# Patient Record
Sex: MALE | Race: ASIAN | NOT HISPANIC OR LATINO | Employment: OTHER | URBAN - METROPOLITAN AREA
[De-identification: names, ages, dates, MRNs, and addresses within clinical notes are randomized per-mention and may not be internally consistent; named-entity substitution may affect disease eponyms.]

---

## 2018-03-27 ENCOUNTER — HOSPITAL ENCOUNTER (EMERGENCY)
Facility: HOSPITAL | Age: 48
Discharge: HOME | End: 2018-03-27
Attending: SPECIALIST
Payer: MEDICAID

## 2018-03-27 VITALS
SYSTOLIC BLOOD PRESSURE: 164 MMHG | RESPIRATION RATE: 16 BRPM | OXYGEN SATURATION: 98 % | DIASTOLIC BLOOD PRESSURE: 83 MMHG | TEMPERATURE: 97 F | HEART RATE: 74 BPM

## 2018-03-27 DIAGNOSIS — M79.674 PAIN OF TOE OF RIGHT FOOT: ICD-10-CM

## 2018-03-27 DIAGNOSIS — S92.411A CLOSED DISPLACED FRACTURE OF PROXIMAL PHALANX OF RIGHT GREAT TOE, INITIAL ENCOUNTER: Primary | ICD-10-CM
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
## 2018-03-27 PROCEDURE — 99284 EMERGENCY DEPT VISIT MOD MDM: CPT | Mod: 25

## 2018-03-27 PROCEDURE — 25000003 PHARM REV CODE 250: Performed by: REGISTERED NURSE

## 2018-03-27 PROCEDURE — 28495 TREAT BIG TOE FRACTURE: CPT | Mod: T5

## 2018-03-27 PROCEDURE — S0020 INJECTION, BUPIVICAINE HYDRO: HCPCS | Performed by: REGISTERED NURSE

## 2018-03-27 RX ORDER — LIDOCAINE HYDROCHLORIDE 10 MG/ML
10 INJECTION, SOLUTION EPIDURAL; INFILTRATION; INTRACAUDAL; PERINEURAL
Status: COMPLETED | OUTPATIENT
Start: 2018-03-27 | End: 2018-03-27

## 2018-03-27 RX ORDER — HYDROCODONE BITARTRATE AND ACETAMINOPHEN 5; 325 MG/1; MG/1
1 TABLET ORAL EVERY 4 HOURS PRN
Qty: 12 TABLET | Refills: 0 | Status: SHIPPED | OUTPATIENT
Start: 2018-03-27

## 2018-03-27 RX ORDER — BUPIVACAINE HYDROCHLORIDE 5 MG/ML
10 INJECTION, SOLUTION EPIDURAL; INTRACAUDAL
Status: COMPLETED | OUTPATIENT
Start: 2018-03-27 | End: 2018-03-27

## 2018-03-27 RX ORDER — LIDOCAINE HYDROCHLORIDE 10 MG/ML
5 INJECTION, SOLUTION EPIDURAL; INFILTRATION; INTRACAUDAL; PERINEURAL
Status: DISCONTINUED | OUTPATIENT
Start: 2018-03-27 | End: 2018-03-27 | Stop reason: HOSPADM

## 2018-03-27 RX ADMIN — BUPIVACAINE HYDROCHLORIDE 50 MG: 5 INJECTION, SOLUTION EPIDURAL; INTRACAUDAL; PERINEURAL at 12:03

## 2018-03-27 RX ADMIN — LIDOCAINE HYDROCHLORIDE 100 MG: 10 INJECTION, SOLUTION EPIDURAL; INFILTRATION; INTRACAUDAL at 12:03

## 2018-03-27 NOTE — ED PROVIDER NOTES
History      Chief Complaint   Patient presents with    Toe Injury     pt reports fall today and injured right great toe       Review of patient's allergies indicates:  No Known Allergies     HPI   HPI    3/27/2018, 11:33 AM   History obtained from the patient      History of Present Illness: Racquel De La Paz is a 47 y.o. male patient who presents to the Emergency Department for R great toe pain after falling approximately 1 hour ago. Symptoms are constant and moderate in severity. No mitigating or exacerbating factors reported. Associated sxs include pain with walking. Patient denies any LOC, weakness, dizziness, CP, SOB, any other injuries, and all other sxs at this time. Prior Tx includes nothing. No further complaints or concerns at this time.         Arrival mode: Personal vehicle      PCP: Salima Maloney MD       Past Medical History:  Past Medical History:   Diagnosis Date    Hypertension     Kidney stones        Past Surgical History:  No past surgical history on file.      Family History:  No family history on file.    Social History:  Social History     Social History Main Topics    Smoking status: Current Every Day Smoker     Packs/day: 1.00     Types: Cigarettes    Smokeless tobacco: Not on file    Alcohol use No    Drug use: No    Sexual activity: Not on file       ROS   Review of Systems   Constitutional: Negative for fever.   HENT: Negative for sore throat.    Respiratory: Negative for shortness of breath.    Cardiovascular: Negative for chest pain.   Gastrointestinal: Negative for nausea.   Genitourinary: Negative for dysuria.   Musculoskeletal: Negative for back pain.        + R great toe pain   Skin: Negative for rash.   Neurological: Negative for dizziness and weakness.   Hematological: Does not bruise/bleed easily.   All other systems reviewed and are negative.      Physical Exam      Initial Vitals [03/27/18 1116]   BP Pulse Resp Temp SpO2   (!) 164/83 74 16 96.8 °F (36 °C) 98 %      MAP        110          Physical Exam  Nursing Notes and Vital Signs Reviewed.  Constitutional: Patient is in no acute distress. Well-developed and well-nourished.  Head: Atraumatic. Normocephalic.  Eyes: PERRL. EOM intact. Conjunctivae are not pale. No scleral icterus.  ENT: Mucous membranes are moist. Oropharynx is clear and symmetric.    Neck: Supple. Full ROM. No lymphadenopathy.  Cardiovascular: Regular rate. Regular rhythm. No murmurs, rubs, or gallops. Distal pulses are 2+ and symmetric.  Pulmonary/Chest: No respiratory distress. Clear to auscultation bilaterally. No wheezing or rales.  Abdominal: Soft and non-distended.  There is no tenderness.  No rebound, guarding, or rigidity. Good bowel sounds.  Genitourinary: No CVA tenderness  Musculoskeletal: Moves all extremities. No obvious deformities. No edema. No calf tenderness. Swelling noted at base of R great toe, decreased ROM, ttp  Skin: Warm and dry.  Neurological:  Alert, awake, and appropriate.  Normal speech.  No acute focal neurological deficits are appreciated.  Psychiatric: Normal affect. Good eye contact. Appropriate in content.    ED Course    Orthopedic Injury  Date/Time: 3/27/2018 1:06 PM  Performed by: CHRISTIAN DUMONT JR  Authorized by: HEATHER CALDERON     Consent Done?:  Yes  Universal Protocol:     Verbal consent obtained?: Yes    Injury:     Injury location:  Toe    Location details:  Right great toe    Injury type:  Dislocation    Dislocation type: PIP        Pre-procedure assessment:     Neurovascular status: Neurovascularly intact      Distal perfusion: normal      Neurological function: normal      Range of motion: reduced      Local anesthesia used?: Yes      Anesthesia:  Digital block    Local anesthetic:  Lidocaine 1% without epinephrine and bupivacaine 0.5% without epinephrine    Anesthetic total (ml):  6      Selections made in this section will also lock the Injury type section above.:     Manipulation performed?: Yes      Reduction  successful?: Yes      Immobilization:  Tape    Splint type: Walking boot   Post-procedure assessment:     Neurovascular status: Neurovascularly intact      Distal perfusion: normal      Neurological function: normal      Range of motion: normal      Patient tolerance:  Patient tolerated the procedure well with no immediate complications    Splint Application  Date/Time: 3/27/2018 1:10 PM  Performed by: CHRISTIAN DUMONT JR  Authorized by: HEATHER CALDERON   Location details: right leg  Splint type: Walking boot   Post-procedure: The splinted body part was neurovascularly unchanged following the procedure.  Patient tolerance: Patient tolerated the procedure well with no immediate complications        ED Vital Signs:  Vitals:    03/27/18 1116   BP: (!) 164/83   Pulse: 74   Resp: 16   Temp: 96.8 °F (36 °C)   TempSrc: Tympanic   SpO2: 98%       Abnormal Lab Results:  Labs Reviewed - No data to display     All Lab Results:      Imaging Results:  Imaging Results          X-Ray Toe 2 or More Views Right (Final result)  Result time 03/27/18 11:55:55    Final result by Sheyla Bateman III, MD (03/27/18 11:55:55)                 Impression:     Acute displaced fracture of the 1st proximal phalanx.      Electronically signed by: SHEYLA BATEMAN MD  Date:     03/27/18  Time:    11:55              Narrative:    XR TOE 2 OR MORE VIEWS RIGHT    Clinical history: Right great toe injury     Findings: There is an acute oblique fracture of the 1st proximal phalanx distal shaft with dorsal medial displacement of the distal fracture fragment. No other fracture is identified.  Joint alignment is anatomic.  The joint spaces are well maintained.                                      The Emergency Provider reviewed the vital signs and test results, which are outlined above.    ED Discussion     1:10 PM: Reassessed pt at this time.  Pt states his condition has improved at this time. Discussed with pt all pertinent ED information and  results. Discussed pt dx and plan of tx. Gave pt all f/u and return to the ED instructions. All questions and concerns were addressed at this time. Pt expresses understanding of information and instructions, and is comfortable with plan to discharge. Pt is stable for discharge.        ED Medication(s):  Medications   lidocaine (PF) 10 mg/ml (1%) injection 50 mg (not administered)   lidocaine (PF) 10 mg/ml (1%) injection 100 mg (100 mg Infiltration Given 3/27/18 1237)   bupivacaine (PF) 0.5% (5 mg/ml) injection 50 mg (50 mg Subcutaneous Given 3/27/18 1238)       New Prescriptions    HYDROCODONE-ACETAMINOPHEN 5-325MG (NORCO) 5-325 MG PER TABLET    Take 1 tablet by mouth every 4 (four) hours as needed for Pain.       Follow-up Information     Salima Maloney MD In 3 days.    Specialty:  Cardiology  Contact information:  [address] [facility] [address]  [phone]             [facility] In 3 days.    Contact information:  [phone]                   Medical Decision Making                   Clinical Impression       ICD-10-CM ICD-9-CM   1. Closed displaced fracture of proximal phalanx of right great toe, initial encounter S92.411A 826.0   2. Pain of toe of right foot M79.674 729.5   3. Fall, initial encounter W19.XXXA E888.9               Maldonado Vogel Jr., [facility]  03/27/18 1734       Maldonado Vogel Jr., [facility]  03/27/18 1738

## 2018-03-28 ENCOUNTER — HOSPITAL ENCOUNTER (OUTPATIENT)
Dept: RADIOLOGY | Facility: HOSPITAL | Age: 48
Discharge: HOME | End: 2018-03-28
Attending: NURSE PRACTITIONER
Payer: MEDICAID

## 2018-03-28 ENCOUNTER — CLINICAL SUPPORT (OUTPATIENT)
Dept: CARDIOLOGY | Facility: CLINIC | Age: 48
End: 2018-03-28
Payer: MEDICAID

## 2018-03-28 ENCOUNTER — HOSPITAL ENCOUNTER (OUTPATIENT)
Dept: RADIOLOGY | Facility: HOSPITAL | Age: 48
Discharge: HOME | End: 2018-03-28
Attending: PODIATRIST
Payer: MEDICAID

## 2018-03-28 ENCOUNTER — OFFICE VISIT (OUTPATIENT)
Dept: INTERNAL MEDICINE | Facility: CLINIC | Age: 48
End: 2018-03-28
Payer: MEDICAID

## 2018-03-28 ENCOUNTER — OFFICE VISIT (OUTPATIENT)
Dept: PODIATRY | Facility: CLINIC | Age: 48
End: 2018-03-28
Payer: MEDICAID

## 2018-03-28 VITALS
HEIGHT: 62 IN | SYSTOLIC BLOOD PRESSURE: 151 MMHG | HEART RATE: 75 BPM | DIASTOLIC BLOOD PRESSURE: 91 MMHG | WEIGHT: 110 LBS | BODY MASS INDEX: 20.24 KG/M2

## 2018-03-28 VITALS
DIASTOLIC BLOOD PRESSURE: 94 MMHG | OXYGEN SATURATION: 99 % | BODY MASS INDEX: 19.63 KG/M2 | SYSTOLIC BLOOD PRESSURE: 140 MMHG | TEMPERATURE: 96 F | HEIGHT: 62 IN | WEIGHT: 106.69 LBS | HEART RATE: 63 BPM

## 2018-03-28 DIAGNOSIS — Z01.818 PREOP EXAMINATION: ICD-10-CM

## 2018-03-28 DIAGNOSIS — Z87.442 HISTORY OF NEPHROLITHIASIS: ICD-10-CM

## 2018-03-28 DIAGNOSIS — Z01.818 PRE-OP TESTING: ICD-10-CM

## 2018-03-28 DIAGNOSIS — Z01.818 PRE-OP EXAM: ICD-10-CM

## 2018-03-28 DIAGNOSIS — I10 BENIGN ESSENTIAL HTN: ICD-10-CM

## 2018-03-28 DIAGNOSIS — S92.421A DISPLACED FRACTURE OF DISTAL PHALANX OF RIGHT GREAT TOE, INITIAL ENCOUNTER FOR CLOSED FRACTURE: Primary | ICD-10-CM

## 2018-03-28 DIAGNOSIS — S92.421A DISPLACED FRACTURE OF DISTAL PHALANX OF RIGHT GREAT TOE, INITIAL ENCOUNTER FOR CLOSED FRACTURE: ICD-10-CM

## 2018-03-28 DIAGNOSIS — Z98.890 POST-OPERATIVE STATE: ICD-10-CM

## 2018-03-28 DIAGNOSIS — S92.411A CLOSED DISPLACED FRACTURE OF PROXIMAL PHALANX OF RIGHT GREAT TOE, INITIAL ENCOUNTER: Primary | ICD-10-CM

## 2018-03-28 DIAGNOSIS — S92.411A CLOSED DISPLACED FRACTURE OF PROXIMAL PHALANX OF RIGHT GREAT TOE, INITIAL ENCOUNTER: ICD-10-CM

## 2018-03-28 DIAGNOSIS — Z01.818 PREOP EXAMINATION: Primary | ICD-10-CM

## 2018-03-28 PROCEDURE — 99999 PR PBB SHADOW E&M-EST. PATIENT-LVL IV: CPT | Mod: PBBFAC,,, | Performed by: PODIATRIST

## 2018-03-28 PROCEDURE — 73630 X-RAY EXAM OF FOOT: CPT | Mod: TC,FY,PO,RT

## 2018-03-28 PROCEDURE — 73630 X-RAY EXAM OF FOOT: CPT | Mod: 26,RT,, | Performed by: RADIOLOGY

## 2018-03-28 PROCEDURE — 99214 OFFICE O/P EST MOD 30 MIN: CPT | Mod: PBBFAC,25,27,PO | Performed by: PODIATRIST

## 2018-03-28 PROCEDURE — 99204 OFFICE O/P NEW MOD 45 MIN: CPT | Mod: S$PBB,,, | Performed by: PODIATRIST

## 2018-03-28 PROCEDURE — 99999 PR PBB SHADOW E&M-EST. PATIENT-LVL III: CPT | Mod: PBBFAC,,, | Performed by: NURSE PRACTITIONER

## 2018-03-28 PROCEDURE — 93005 ELECTROCARDIOGRAM TRACING: CPT | Mod: PBBFAC,PO | Performed by: INTERNAL MEDICINE

## 2018-03-28 PROCEDURE — 71046 X-RAY EXAM CHEST 2 VIEWS: CPT | Mod: TC,FY,PO

## 2018-03-28 PROCEDURE — 93010 ELECTROCARDIOGRAM REPORT: CPT | Mod: S$PBB,,, | Performed by: INTERNAL MEDICINE

## 2018-03-28 PROCEDURE — 99213 OFFICE O/P EST LOW 20 MIN: CPT | Mod: PBBFAC,25,PO | Performed by: NURSE PRACTITIONER

## 2018-03-28 PROCEDURE — 99214 OFFICE O/P EST MOD 30 MIN: CPT | Mod: S$PBB,,, | Performed by: NURSE PRACTITIONER

## 2018-03-28 PROCEDURE — 71046 X-RAY EXAM CHEST 2 VIEWS: CPT | Mod: 26,,, | Performed by: RADIOLOGY

## 2018-03-28 RX ORDER — HYDROCODONE BITARTRATE AND ACETAMINOPHEN 10; 325 MG/1; MG/1
1 TABLET ORAL EVERY 6 HOURS PRN
Qty: 30 TABLET | Refills: 0 | Status: SHIPPED | OUTPATIENT
Start: 2018-03-28

## 2018-03-28 RX ORDER — CEPHALEXIN 500 MG/1
500 CAPSULE ORAL EVERY 12 HOURS
Qty: 6 CAPSULE | Refills: 0 | Status: SHIPPED | OUTPATIENT
Start: 2018-03-28 | End: 2018-03-31

## 2018-03-28 RX ORDER — LIDOCAINE HYDROCHLORIDE 10 MG/ML
1 INJECTION, SOLUTION EPIDURAL; INFILTRATION; INTRACAUDAL; PERINEURAL ONCE
Status: CANCELLED | OUTPATIENT
Start: 2018-03-28 | End: 2018-03-28

## 2018-03-28 NOTE — LETTER
March 28, 2018      Sarah Hernandez DPM  [address] [place] [address]           [facility]  [address] [place] [address]  Phone: [phone]  Fax: [phone]          Patient: Racquel De La Paz   MR Number: [id number]   YOB: 1970   Date of Visit: 3/28/2018       Dear Dr. Sarah Hernandez:    Thank you for referring Racquel De La Paz to me for evaluation. Attached you will find relevant portions of my assessment and plan of care.    If you have questions, please do not hesitate to call me. I look forward to following Racquel De La Paz along with you.    Sincerely,    Meli Waller, JAD    Enclosure  CC:  No Recipients    If you would like to receive this communication electronically, please contact [email or web address] or [phone] to request more information on [employer] access.    For providers and/or their staff who would like to refer a patient to [facility], please contact us through our one-stop-shop provider referral line, [facility] , at [phone].    If you feel you have received this communication in error or would no longer like to receive these types of communications, please e-mail [email or web address]

## 2018-03-28 NOTE — PROGRESS NOTES
[facility]  PODIATRIC MEDICINE AND SURGERY  PROGRESS NOTE    Reason for Visit   Chief Complaint   Patient presents with    Foot Injury     right hallux displaced toe fracture ambulating in tall cam boot DOI 3/27/18       HPI  Racquel De La Paz is a 47 y.o. male w/ PMH of HTN, who presents today after sustaining injury to right great toe. Pt describes mechanism as fall resulting in injuring his right great toe. They deny any LOC or proximal leg pain. Pt states unable to bear any weight on right great toe. Severity of pain noted to be 8/10.  Pt was seen in ER in which closed reduction was attempted, but unsuccessful. Pt is ambulating in boot with discomfort.   Patient denies other pedal complaints at this time.    DOI:3/27/18       PMH  Past Medical History:   Diagnosis Date    Hypertension     Kidney stones        MEDS  Current Outpatient Prescriptions on File Prior to Visit   Medication Sig Dispense Refill    hydrocodone-acetaminophen 5-325mg (NORCO) 5-325 mg per tablet Take 1 tablet by mouth every 4 (four) hours as needed for Pain. 12 tablet 0    ketorolac (TORADOL) 10 mg tablet Take 1 tablet (10 mg total) by mouth every 6 (six) hours. 14 tablet 0    ondansetron (ZOFRAN-ODT) 4 MG TbDL Take 1 tablet (4 mg total) by mouth every 6 (six) hours as needed. 15 tablet 0    tamsulosin (FLOMAX) 0.4 mg Cp24 Take 1 capsule (0.4 mg total) by mouth once daily. 30 capsule 0     No current facility-administered medications on file prior to visit.        PSH   No past surgical history on file.     ALL  Review of patient's allergies indicates:  No Known Allergies    SOC     Social History   Substance Use Topics    Smoking status: Current Every Day Smoker     Packs/day: 1.00     Types: Cigarettes    Smokeless tobacco: Never Used    Alcohol use No         Family HX  No family history on file.         REVIEW OF SYSTEMS  General: Denies any fever or chills  Chest: Denies shortness of breath, wheezing, coughing, or  "sputum production  Heart: Denies chest pain, cold extremities, orthopenia, or reduced exercise tolerance  Musk: Positive for pain as noted to affected extremity in HPI   As noted above and per history of current illness above, otherwise negative in the remainder of the 14 systems.      PHYSICAL EXAM  Vitals:    03/28/18 0943   BP: (!) 151/91   Pulse: 75   Weight: 49.9 kg (110 lb 0.2 oz)   Height: 5' 2" (1.575 m)   PainSc:   8   PainLoc: Toe       General: This patient is well-developed, well-nourished and appears stated age, well-oriented to person, place and time, and cooperative and pleasant on today's visit    Lower Extremity Physical Exam    Vascular exam:   · Dorsalis pedis and posterior tibial pulses palpable 2/4 bilaterally.   · Capillary refill time immediate to the toes.   · Feet are warm to the touch. Skin temperature warm to warm from proximally to distally   · There are no varicosities, telangiectasias noted to bilateral foot and ankle regions.   · There is  Edema and erythema localized to right hallux     Dermatologic exam:   · Skin moist with healthy texture and turgor.  · There are no open ulcerations, lacerations, or fissures to bilateral foot and ankle regions.  · There is  evidence of ecchymosis.  There are no open wounds noted.    Neuro:   · Epicritic sensation is intact as the patient is able to sense light touch to bilateral foot and ankle regions.   · Achilles and patellar deep tendon reflexes intact  · Babinski reflex absent    MSK:   + Wiggle toes   + pain dorsal aspect of right hallux with dorsal dislocation palpable of right hallux  (-) pain at 5th metatarsal base  (-) pain at fibular malleolus  (-) pain at medial malleolus  (-) pain upon palpation of tib-fib prox syndesmosis  (-) pain at ATFL, CFL, or PTFL  (-) pain at deltoid ligaments    IMAGING   Reviewed by me and I agree with radiologist findings, 3 views of foot/ankle, reveal:  No results found for this or any previous visit.    "     No results found for this or any previous visit.    No results found for this or any previous visit.     Results for orders placed during the hospital encounter of 03/28/18   X-Ray Foot Complete 3 view Right    Narrative EXAMINATION:  XR FOOT COMPLETE 3 VIEW RIGHT    CLINICAL HISTORY:  . Displaced fracture of proximal phalanx of right great toe, initial encounter for closed fracture    TECHNIQUE:  AP, lateral, and oblique views of the right foot were performed.    COMPARISON:  03/27/2018    FINDINGS:  The recently described fracture involving the 1st proximal phalanx is again noted.  Fragment positioning appears grossly unchanged allowing for projectional differences.  Soft tissue swelling noted along the dorsum of the foot.  No associated dislocation.  Remaining visualized osseous structures appear intact.  Joint spaces are preserved.  No erosive changes demonstrated.      Impression As above.      Electronically signed by: Fco Logan MD  Date:    03/28/2018  Time:    09:02         ASSESSMENT  1. Displaced fracture of distal phalanx of right great toe, initial encounter for closed fracture  Place in Outpatient    Vital signs    Strict bedrest    Insert peripheral IV    Height and weight    POCT glucose    Verify surgical site    Verify informed consent    Void    Notify physician     Pulse Oximetry Q4H    Consult to Anesthesiology    Case Request Operating Room: OPEN REDUCTION INTERNAL FIXATION-HALLUX    Insert peripheral IV   2. Pre-op testing  CBC auto differential    Basic metabolic panel    EKG 12-lead   3. Pre-op exam  Ambulatory Referral to Internal Medicine   4. Post-operative state  X-Ray Foot Complete Right       PLAN  1. Patient was educated about clinical and imaging findings, and verbalizes understanding of above.     Diagnoses and all orders for this visit:  Displaced fracture of distal phalanx of right great toe, initial encounter for closed fracture  -     Place in Outpatient; Standing  -      Vital signs; Standing  -     Strict bedrest; Standing  -     Insert peripheral IV; Standing  -     Height and weight; Standing  -     POCT glucose; Standing  -     Verify surgical site; Standing  -     Verify informed consent; Standing  -     Void; Standing  -     Notify physician ; Standing  -     Pulse Oximetry Q4H; Standing  -     Consult to Anesthesiology; Standing  -     Case Request Operating Room: OPEN REDUCTION INTERNAL FIXATION-HALLUX    Pre-op testing  -     CBC auto differential; Future; Expected date: 03/28/2018  -     Basic metabolic panel; Future; Expected date: 03/28/2018  -     EKG 12-lead; Future    Pre-op exam  -     Ambulatory Referral to Internal Medicine    Post-operative state  -     X-Ray Foot Complete Right; Future; Expected date: 03/28/2018    Other orders  -     hydrocodone-acetaminophen 10-325mg (NORCO)  mg Tab; Take 1 tablet by mouth every 6 (six) hours as needed for Pain. Do not start until AFTER SURGERY  Dispense: 30 tablet; Refill: 0  -     cephALEXin (KEFLEX) 500 MG capsule; Take 1 capsule (500 mg total) by mouth every 12 (twelve) hours. AFTER SURGERY  Dispense: 6 capsule; Refill: 0  -     lidocaine (PF) 10 mg/ml (1%) injection 10 mg; Inject 1 mL (10 mg total) into the skin once.  -     ceFAZolin (ANCEF) 2 g in dextrose 5 % 50 mL IVPB; Inject 2 g into the vein On call Procedure (Surgery).      2. Treatment plan: Discussed operative intervention due to dislocation and displacement of toe fracture. Patient agree to proceed with operative intervention scheduled for April 2, 2018. A well padded dressing applied to right foot. Pt informed to rest, ice above noted area to assist with edema control. Discussed smoking cessation and impediment of healing. Will schedule preop labs and clearance by MD for operative risk stratification.  Informed Consent has been given to the patient. The patient understands the surgery, procedure, risks, alternatives and complications, including but not  limited to reaction to medication/anesthetics, bleeding, infection, continuous pain, incomplete pain relief, worse pain, complex regional pain syndrome/RSD, failure to relieve pain, incomplete bone and soft tissue healing, numbness, nerve damage, prolonged or incomplete correction, recurrence, foot/ankle deterioration, new deformity, permanent edema, nerve or vascular damage, blood clots, pulmonary embolism, scarring, instability, limb length inequality, problems with motion and strength, failure of implants/fixation (if applicable), the possible need for more extensive surgery, the possible need for future surgery, and the distant possibility of myocardial infarction, stroke, loss of limb/life. We also discussed the expected benefits and alternatives of the procedure, including the consequences of not proceeding with the surgery, as well as the expected postop course. No guarantees were given nor implied. The procedure has been fully reviewed with the patient and written informed consent has been obtained. We have discussed the perioperative expectations. Having answered these questions and understanding the risks and benefits of the surgery, patient has opted to proceed with the surgery.   The patient understands that vendors for surgical products may be present in the operating room.    The total visit time face to face with the patient was over 30 minutes. Time spent in counseling, discussion and coordination of care with the patient was over 15 minutes. Topics discussed as noted above.      Future Appointments  Date Time Provider Department Center   4/12/2018 2:00 PM ONE[facility] Melissa POD [facility]   4/19/2018 2:00 PM [facility] Melissa [facility]   4/30/2018 2:15 PM [facility] XR2 [facility] XRAY [facility]   4/30/2018 2:40 PM Sarah Hernandez DPM [facility] POD [facility]       Report Electronically Signed By:  Sarah Cabrera DPM   Podiatric Medicine & Surgery  [facility]  3/29/2018

## 2018-03-28 NOTE — PROGRESS NOTES
Subjective:       Patient ID: Racquel De La Paz is a 47 y.o. male.    Chief Complaint: Pre-op Exam    Pre op exam for scheduled surgery-right toe closed fracture per Dr. Hernandez on 4/2/18.     PMH kidney stones- pt states that he did have a lithotripsy per Dr. Lee Villela at [facility] two years ago. He has not had any other occurrences since then    HTN- he states that he takes an oral BP medication daily prescribed by his PCP Dr. Maloney but does not recall the name of the medication. He reports that he checks his BP at home- he cannot recall how often or what his BP readings are. He avoids sodium. He drinks adequate water intake. He denies, cp, sob, headaches, blurred vision, dizziness, syncope, or leg swelling.     He reports that the fracture of his right toe occurred after walking into a bathroom at home and slipped due to water being on the floor. This fall caused the toe fracture. He has been taking hydrocodone per ER physician which helps somewhat with pain. New rxs today per podiatry include keflex and norco.     He denies fever, sweats, chills, n/v/d, abd pain or urinary discomfort.           Review of Systems   Constitutional: Positive for activity change. Negative for appetite change, chills, diaphoresis, fatigue, fever and unexpected weight change.   HENT: Negative for congestion, ear pain, postnasal drip, rhinorrhea, sinus pain, sinus pressure, sneezing, sore throat, tinnitus, trouble swallowing and voice change.    Eyes: Negative for photophobia, pain and visual disturbance.   Respiratory: Negative for cough, chest tightness, shortness of breath and wheezing.    Cardiovascular: Negative for chest pain, palpitations and leg swelling.   Gastrointestinal: Negative for abdominal distention, abdominal pain, blood in stool, constipation, diarrhea, nausea and vomiting.   Genitourinary: Negative for dysuria and frequency.   Musculoskeletal: Positive for arthralgias and myalgias. Negative for back pain, joint swelling,  neck pain and neck stiffness.   Neurological: Negative for dizziness, tremors, seizures, syncope, facial asymmetry, speech difficulty, weakness, light-headedness, numbness and headaches.   Psychiatric/Behavioral: Negative for confusion and sleep disturbance.       Objective:      Physical Exam   Constitutional: He is oriented to person, place, and time.   Eyes: Conjunctivae and EOM are normal.   Neck: Normal range of motion. Neck supple.   Cardiovascular: Normal rate, regular rhythm, normal heart sounds and intact distal pulses.    Pulmonary/Chest: Effort normal and breath sounds normal.   Abdominal: Soft. Bowel sounds are normal.   Musculoskeletal: Normal range of motion.   Dressing CDI to right foot - ortho shoe present.    Neurological: He is alert and oriented to person, place, and time.   Skin: Skin is warm and dry. Capillary refill takes less than 2 seconds.   Psychiatric: He has a normal mood and affect.       Assessment:       1. Preop examination    2. Displaced fracture of distal phalanx of right great toe, initial encounter for closed fracture    3. Benign essential HTN    4. History of nephrolithiasis        Plan:   Preop examination  -     X-Ray Chest PA And Lateral; Future; Expected date: 03/28/2018  -     Urinalysis; Future; Expected date: 03/28/2018    Displaced fracture of distal phalanx of right great toe, initial encounter for closed fracture    Benign essential HTN    History of nephrolithiasis    Other orders  -     Cancel: Comprehensive metabolic panel; Future; Expected date: 03/28/2018  -     Cancel: CBC auto differential; Future; Expected date: 03/28/2018  -     Cancel: EKG 12-lead; Future      Labs and EKG ordered per podiatry  CXR and UA now  Clearance pending results    Addendum- all pre-op testing normal. Pt is at low risk for perioperative complications. He is cleared for surgery.

## 2018-03-29 ENCOUNTER — TELEPHONE (OUTPATIENT)
Dept: INTERNAL MEDICINE | Facility: CLINIC | Age: 48
End: 2018-03-29

## 2018-03-29 ENCOUNTER — ANESTHESIA EVENT (OUTPATIENT)
Dept: SURGERY | Facility: HOSPITAL | Age: 48
End: 2018-03-29
Payer: MEDICAID

## 2018-03-29 RX ORDER — BISOPROLOL FUMARATE AND HYDROCHLOROTHIAZIDE 5; 6.25 MG/1; MG/1
1 TABLET ORAL DAILY
COMMUNITY

## 2018-03-29 NOTE — TELEPHONE ENCOUNTER
----- Message from Meli Waller NP sent at 3/29/2018  8:53 AM CDT -----  Please inform patient that he is cleared for surgery on Monday.

## 2018-03-29 NOTE — PRE-PROCEDURE INSTRUCTIONS
Pre op instructions reviewed with patient per phone:    To confirm, Your surgeon has instructed you:  Surgery is scheduled 4/2/18 at 0700.      Please report to [facility] 1st floor main lobby by 0530.   Pre admit office to call this afternoon only if arrival time for surgery changes.      INSTRUCTIONS IMPORTANT!!!  ¨ Do not eat, drink, or smoke after 12 midnight-including water. OK to brush teeth, no gum, candy or mints!    ¨ Take only these medicines with a small swallow of water-morning of surgery.  None  ____  Do not wear makeup, including mascara.  ____  No powder, lotions or creams to surgical area.  ____  Please remove all jewelry, including piercings and leave at home.  ____  No money or valuables needed. Please leave at home.  ____  Please bring identification and insurance information to hospital.  ____  If going home the same day, arrange for a ride home. You will not be able to   drive if Anesthesia was used.  ____  Children, under 12 years old, must remain in the waiting room with an adult.  They are not allowed in patient areas.  ____  Wear loose fitting clothing. Allow for dressings, bandages.  ____  Stop Aspirin, Ibuprofen, Motrin and Aleve at least 5-7 days before surgery, unless otherwise instructed by your doctor, or the nurse.   You MAY use Tylenol/acetaminophen until day of surgery.  ____  If you take diabetic medication, do not take am of surgery unless instructed by   Doctor.  ____ Stop taking any Fish Oil supplement or any Vitamins that contain Vitamin E at least 5 days prior to surgery.          Bathing Instructions-- The night before surgery and the morning prior to coming to the hospital:   -Do not shave the surgical area.   -Shower and wash your hair and body as usual with anti-bacterial  soap and shampoo.   -Rinse your hair and body completely.   -Use one packet of hibiclens to wash the surgical site (using your hand) gently for 5 minutes.  Do not scrub you skin  too hard.   -Do not use hibiclens on your head, face, or genitals.   -Do not wash with anti-bacterial soap after you use the hibiclens.   -Rinse your body thoroughly.   -Dry with clean, soft towel.  Do not use lotion, cream, deodorant, or powders on   the surgical site.    Use antibacterial soap in place of hibiclens if your surgery is on the head, face or genitals.         Surgical Site Infection    Prevention of surgical site infections:     -Keep incisions clean and dry.   -Do not soak/submerge incisions in water until completely healed.   -Do not apply lotions, powders, creams, or deodorants to site.   -Always make sure hands are cleaned with antibacterial soap/ alcohol-based   prior to touching the surgical site.  (This includes doctors, nurses, staff, and yourself.)    Signs and symptoms:   -Redness and pain around the area where you had surgery   -Drainage of cloudy fluid from your surgical wound   -Fever over 100.4  I have read or had read and explained to me, and understand the above information.

## 2018-04-02 ENCOUNTER — SURGERY (OUTPATIENT)
Age: 48
End: 2018-04-02

## 2018-04-02 ENCOUNTER — ANESTHESIA (OUTPATIENT)
Dept: SURGERY | Facility: HOSPITAL | Age: 48
End: 2018-04-02
Payer: MEDICAID

## 2018-04-02 ENCOUNTER — HOSPITAL ENCOUNTER (OUTPATIENT)
Facility: HOSPITAL | Age: 48
Discharge: HOME | End: 2018-04-02
Attending: PODIATRIST | Admitting: PODIATRIST
Payer: MEDICAID

## 2018-04-02 DIAGNOSIS — S92.421A DISPLACED FRACTURE OF DISTAL PHALANX OF RIGHT GREAT TOE, INITIAL ENCOUNTER FOR CLOSED FRACTURE: ICD-10-CM

## 2018-04-02 PROCEDURE — 01480 ANES OPEN PX LOWER L/A/F NOS: CPT | Performed by: PODIATRIST

## 2018-04-02 PROCEDURE — S0020 INJECTION, BUPIVICAINE HYDRO: HCPCS | Performed by: PODIATRIST

## 2018-04-02 PROCEDURE — 25000003 PHARM REV CODE 250: Performed by: PODIATRIST

## 2018-04-02 PROCEDURE — 37000009 HC ANESTHESIA EA ADD 15 MINS: Performed by: PODIATRIST

## 2018-04-02 PROCEDURE — 27201423 OPTIME MED/SURG SUP & DEVICES STERILE SUPPLY: Performed by: PODIATRIST

## 2018-04-02 PROCEDURE — 37000008 HC ANESTHESIA 1ST 15 MINUTES: Performed by: PODIATRIST

## 2018-04-02 PROCEDURE — C1769 GUIDE WIRE: HCPCS | Performed by: PODIATRIST

## 2018-04-02 PROCEDURE — 25000003 PHARM REV CODE 250: Performed by: NURSE ANESTHETIST, CERTIFIED REGISTERED

## 2018-04-02 PROCEDURE — 63600175 PHARM REV CODE 636 W HCPCS: Performed by: ANESTHESIOLOGY

## 2018-04-02 PROCEDURE — 63600175 PHARM REV CODE 636 W HCPCS: Performed by: NURSE ANESTHETIST, CERTIFIED REGISTERED

## 2018-04-02 PROCEDURE — 71000015 HC POSTOP RECOV 1ST HR: Performed by: PODIATRIST

## 2018-04-02 PROCEDURE — 36000708 HC OR TIME LEV III 1ST 15 MIN: Performed by: PODIATRIST

## 2018-04-02 PROCEDURE — 28505 TREAT BIG TOE FRACTURE: CPT | Mod: T5,,, | Performed by: PODIATRIST

## 2018-04-02 PROCEDURE — 71000033 HC RECOVERY, INTIAL HOUR: Performed by: PODIATRIST

## 2018-04-02 PROCEDURE — 36000709 HC OR TIME LEV III EA ADD 15 MIN: Performed by: PODIATRIST

## 2018-04-02 PROCEDURE — C1713 ANCHOR/SCREW BN/BN,TIS/BN: HCPCS | Performed by: PODIATRIST

## 2018-04-02 DEVICE — SCREW BONE CORT 2X18MM: Type: IMPLANTABLE DEVICE | Site: FOOT | Status: FUNCTIONAL

## 2018-04-02 DEVICE — SCREW BONE CORT 1.5X16MM: Type: IMPLANTABLE DEVICE | Site: FOOT | Status: FUNCTIONAL

## 2018-04-02 RX ORDER — BUPIVACAINE HYDROCHLORIDE 5 MG/ML
INJECTION, SOLUTION EPIDURAL; INTRACAUDAL
Status: DISCONTINUED | OUTPATIENT
Start: 2018-04-02 | End: 2018-04-02 | Stop reason: HOSPADM

## 2018-04-02 RX ORDER — PROPOFOL 10 MG/ML
VIAL (ML) INTRAVENOUS
Status: DISCONTINUED | OUTPATIENT
Start: 2018-04-02 | End: 2018-04-02

## 2018-04-02 RX ORDER — SODIUM CHLORIDE, SODIUM LACTATE, POTASSIUM CHLORIDE, CALCIUM CHLORIDE 600; 310; 30; 20 MG/100ML; MG/100ML; MG/100ML; MG/100ML
INJECTION, SOLUTION INTRAVENOUS CONTINUOUS PRN
Status: DISCONTINUED | OUTPATIENT
Start: 2018-04-02 | End: 2018-04-02

## 2018-04-02 RX ORDER — MIDAZOLAM HYDROCHLORIDE 1 MG/ML
INJECTION, SOLUTION INTRAMUSCULAR; INTRAVENOUS
Status: DISCONTINUED | OUTPATIENT
Start: 2018-04-02 | End: 2018-04-02

## 2018-04-02 RX ORDER — FENTANYL CITRATE 50 UG/ML
INJECTION, SOLUTION INTRAMUSCULAR; INTRAVENOUS
Status: DISCONTINUED | OUTPATIENT
Start: 2018-04-02 | End: 2018-04-02

## 2018-04-02 RX ORDER — PHENYLEPHRINE HYDROCHLORIDE 10 MG/ML
INJECTION INTRAVENOUS
Status: DISCONTINUED | OUTPATIENT
Start: 2018-04-02 | End: 2018-04-02

## 2018-04-02 RX ORDER — ACETAMINOPHEN 10 MG/ML
INJECTION, SOLUTION INTRAVENOUS
Status: DISCONTINUED | OUTPATIENT
Start: 2018-04-02 | End: 2018-04-02

## 2018-04-02 RX ORDER — ONDANSETRON 2 MG/ML
4 INJECTION INTRAMUSCULAR; INTRAVENOUS DAILY PRN
Status: DISCONTINUED | OUTPATIENT
Start: 2018-04-02 | End: 2018-04-02 | Stop reason: HOSPADM

## 2018-04-02 RX ORDER — DIPHENHYDRAMINE HYDROCHLORIDE 50 MG/ML
25 INJECTION INTRAMUSCULAR; INTRAVENOUS EVERY 6 HOURS PRN
Status: DISCONTINUED | OUTPATIENT
Start: 2018-04-02 | End: 2018-04-02 | Stop reason: HOSPADM

## 2018-04-02 RX ORDER — PROPOFOL 10 MG/ML
VIAL (ML) INTRAVENOUS CONTINUOUS PRN
Status: DISCONTINUED | OUTPATIENT
Start: 2018-04-02 | End: 2018-04-02

## 2018-04-02 RX ORDER — LIDOCAINE HYDROCHLORIDE 10 MG/ML
INJECTION INFILTRATION; PERINEURAL
Status: DISCONTINUED | OUTPATIENT
Start: 2018-04-02 | End: 2018-04-02 | Stop reason: HOSPADM

## 2018-04-02 RX ORDER — LIDOCAINE HYDROCHLORIDE 10 MG/ML
INJECTION INFILTRATION; PERINEURAL
Status: DISCONTINUED | OUTPATIENT
Start: 2018-04-02 | End: 2018-04-02

## 2018-04-02 RX ORDER — HYDROCODONE BITARTRATE AND ACETAMINOPHEN 5; 325 MG/1; MG/1
1 TABLET ORAL
Status: DISCONTINUED | OUTPATIENT
Start: 2018-04-02 | End: 2018-04-02 | Stop reason: HOSPADM

## 2018-04-02 RX ORDER — LIDOCAINE HYDROCHLORIDE 10 MG/ML
1 INJECTION, SOLUTION EPIDURAL; INFILTRATION; INTRACAUDAL; PERINEURAL ONCE
Status: DISCONTINUED | OUTPATIENT
Start: 2018-04-02 | End: 2018-04-02 | Stop reason: HOSPADM

## 2018-04-02 RX ORDER — MEPERIDINE HYDROCHLORIDE 50 MG/ML
12.5 INJECTION INTRAMUSCULAR; INTRAVENOUS; SUBCUTANEOUS ONCE AS NEEDED
Status: COMPLETED | OUTPATIENT
Start: 2018-04-02 | End: 2018-04-02

## 2018-04-02 RX ORDER — FENTANYL CITRATE 50 UG/ML
25 INJECTION, SOLUTION INTRAMUSCULAR; INTRAVENOUS EVERY 5 MIN PRN
Status: DISCONTINUED | OUTPATIENT
Start: 2018-04-02 | End: 2018-04-02 | Stop reason: HOSPADM

## 2018-04-02 RX ADMIN — MIDAZOLAM 1 MG: 1 INJECTION INTRAMUSCULAR; INTRAVENOUS at 07:04

## 2018-04-02 RX ADMIN — FENTANYL CITRATE 25 MCG: 50 INJECTION, SOLUTION INTRAMUSCULAR; INTRAVENOUS at 07:04

## 2018-04-02 RX ADMIN — LIDOCAINE HYDROCHLORIDE 10 ML: 10 INJECTION, SOLUTION INFILTRATION; PERINEURAL at 07:04

## 2018-04-02 RX ADMIN — PHENYLEPHRINE HYDROCHLORIDE 100 MCG: 10 INJECTION INTRAVENOUS at 07:04

## 2018-04-02 RX ADMIN — FENTANYL CITRATE 50 MCG: 50 INJECTION, SOLUTION INTRAMUSCULAR; INTRAVENOUS at 08:04

## 2018-04-02 RX ADMIN — PROPOFOL 200 MCG/KG/MIN: 10 INJECTION, EMULSION INTRAVENOUS at 07:04

## 2018-04-02 RX ADMIN — SODIUM CHLORIDE, SODIUM LACTATE, POTASSIUM CHLORIDE, AND CALCIUM CHLORIDE: 600; 310; 30; 20 INJECTION, SOLUTION INTRAVENOUS at 06:04

## 2018-04-02 RX ADMIN — MEPERIDINE HYDROCHLORIDE 12.5 MG: 50 INJECTION INTRAMUSCULAR; INTRAVENOUS; SUBCUTANEOUS at 09:04

## 2018-04-02 RX ADMIN — FENTANYL CITRATE 25 MCG: 50 INJECTION, SOLUTION INTRAMUSCULAR; INTRAVENOUS at 09:04

## 2018-04-02 RX ADMIN — BUPIVACAINE HYDROCHLORIDE 30 ML: 5 INJECTION, SOLUTION EPIDURAL; INTRACAUDAL; PERINEURAL at 07:04

## 2018-04-02 RX ADMIN — LIDOCAINE HYDROCHLORIDE 50 MG: 10 INJECTION, SOLUTION INFILTRATION; PERINEURAL at 07:04

## 2018-04-02 RX ADMIN — PROPOFOL 50 MG: 10 INJECTION, EMULSION INTRAVENOUS at 07:04

## 2018-04-02 RX ADMIN — SODIUM CHLORIDE, SODIUM LACTATE, POTASSIUM CHLORIDE, AND CALCIUM CHLORIDE: 600; 310; 30; 20 INJECTION, SOLUTION INTRAVENOUS at 08:04

## 2018-04-02 RX ADMIN — FENTANYL CITRATE 25 MCG: 50 INJECTION, SOLUTION INTRAMUSCULAR; INTRAVENOUS at 08:04

## 2018-04-02 RX ADMIN — CEFAZOLIN 2 G: 1 INJECTION, POWDER, FOR SOLUTION INTRAMUSCULAR; INTRAVENOUS at 07:04

## 2018-04-02 RX ADMIN — MIDAZOLAM 1 MG: 1 INJECTION INTRAMUSCULAR; INTRAVENOUS at 06:04

## 2018-04-02 RX ADMIN — ACETAMINOPHEN 1000 MG: 10 INJECTION, SOLUTION INTRAVENOUS at 08:04

## 2018-04-02 NOTE — ANESTHESIA PREPROCEDURE EVALUATION
04/02/2018  Racquel De La Paz is a 47 y.o., male.    Anesthesia Evaluation    I have reviewed the Patient Summary Reports.    I have reviewed the Nursing Notes.   I have reviewed the Medications.     Review of Systems  Anesthesia Hx:  No problems with previous Anesthesia  Denies Family Hx of Anesthesia complications.   Denies Personal Hx of Anesthesia complications.   Social:  Smoker    Cardiovascular:   Hypertension, well controlled    Pulmonary:  Pulmonary Normal    Renal/:   renal calculi    Hepatic/GI:  Hepatic/GI Normal    Neurological:  Neurology Normal    Endocrine:  Endocrine Normal        Physical Exam  General:  Well nourished    Airway/Jaw/Neck:  Airway Findings: Mallampati: I      Chest/Lungs:  Chest/Lungs Findings: Clear to auscultation     Heart/Vascular:  Heart Findings: Rate: Normal  Rhythm: Regular Rhythm             Anesthesia Plan  Type of Anesthesia, risks & benefits discussed:  Anesthesia Type:  MAC  Patient's Preference:   Intra-op Monitoring Plan:   Intra-op Monitoring Plan Comments:   Post Op Pain Control Plan:   Post Op Pain Control Plan Comments:   Induction:   IV  Beta Blocker:  Patient is not currently on a Beta-Blocker (No further documentation required).       Informed Consent: Patient understands risks and agrees with Anesthesia plan.  Questions answered. Anesthesia consent signed with patient.  ASA Score: 2     Day of Surgery Review of History & Physical: I have interviewed and examined the patient. I have reviewed the patient's H&P dated:  There are no significant changes.          Ready For Surgery From Anesthesia Perspective.

## 2018-04-02 NOTE — BRIEF OP NOTE
[facility]  BRIEF OPERATIVE NOTE  Date: 4/2/2018   Patient: Racquel De La Paz   Medical Record Number: [id number]   Surgeon: Sarah Hernandez DPM  Assistant: None  Preoperative Diagnosis: closed displaced fracture of right hallux  Postoperative Diagnosis: closed displaced fracture of right hallux  Surgical Procedure(s): Open reduction with internal fixation, right hallux  Pathology: none sent   Anesthesia:   MAC with local injection of 20 ccs 1:1 mixture of 0.5% marcaine plain and 1% lidocaine plain   Hemostasis: pneumatic ankle tourniquet @ 250mm Hg for 90 minutes  Estimated blood loss: 5mL  Materials:   1.3-0, 2-0 vicryl, 4-0 nylon  Implants: 2.0 mm x 18 mm synthes cortical screw, 1.5 mm x 16 mm synthes cortical screw   Findings: consistent with diagnosis  Fluids: per anesthesia  Complications: None  Condition: Stable

## 2018-04-02 NOTE — TRANSFER OF CARE
"Anesthesia Transfer of Care Note    Patient: Racquel De La Paz    Procedure(s) Performed: Procedure(s) (LRB):  OPEN REDUCTION INTERNAL FIXATION-HALLUX (Right)    Patient location: PACU    Anesthesia Type: MAC    Transport from OR: Transported from OR on room air with adequate spontaneous ventilation    Post pain: adequate analgesia    Post assessment: no apparent anesthetic complications and tolerated procedure well    Post vital signs: stable    Level of consciousness: sedated    Nausea/Vomiting: no nausea/vomiting    Complications: none    Transfer of care protocol was followed      Last vitals:   Visit Vitals  BP (!) 140/95   Pulse 72   Temp 36.7 °C (98 °F) (Tympanic)   Resp 18   Ht 5' 2" (1.575 m)   Wt 48.3 kg (106 lb 7.7 oz)   BMI 19.48 kg/m²     "

## 2018-04-02 NOTE — DISCHARGE SUMMARY
[facility]  Discharge Note  Short Stay    Admit Date: 4/2/2018    Discharge Date and Time: No discharge date for patient encounter.     Attending Physician: Sarah Hernandez DPM     Discharge Provider: Sarah Hernandez    Diagnoses:  Active Hospital Problems    Diagnosis  POA    Displaced fracture of distal phalanx of right great toe, initial encounter for closed fracture [S92.421A]  Yes      Resolved Hospital Problems    Diagnosis Date Resolved POA   No resolved problems to display.       Discharged Condition: good    Hospital Course: Patient was admitted for an outpatient procedure and tolerated the procedure well with no complications.    Final Diagnoses: Same as principal problem.    Disposition: Home or Self Care    Follow up/Patient Instructions:    Medications:  Reconciled Home Medications:      Medication List      CONTINUE taking these medications    bisoprolol-hydrochlorothiazide 5-6.25 mg 5-6.25 mg Tab  Commonly known as:  ZIAC     * hydrocodone-acetaminophen 5-325mg 5-325 mg per tablet  Commonly known as:  NORCO  Take 1 tablet by mouth every 4 (four) hours as needed for Pain.     * hydrocodone-acetaminophen 10-325mg  mg Tab  Commonly known as:  NORCO  Take 1 tablet by mouth every 6 (six) hours as needed for Pain. Do not start until AFTER SURGERY     ondansetron 4 MG Tbdl  Commonly known as:  ZOFRAN-ODT  Take 1 tablet (4 mg total) by mouth every 6 (six) hours as needed.     tamsulosin 0.4 mg Cp24  Commonly known as:  FLOMAX  Take 1 capsule (0.4 mg total) by mouth once daily.        * This list has 2 medication(s) that are the same as other medications prescribed for you. Read the directions carefully, and ask your doctor or other care provider to review them with you.            ASK your doctor about these medications    ketorolac 10 mg tablet  Commonly known as:  TORADOL  Take 1 tablet (10 mg total) by mouth every 6 (six) hours.            Discharge Procedure Orders  CRUTCHES FOR  "HOME USE   Order Specific Question Answer Comments   Type: Axillary    Height: 5' 2" (1.575 m)    Weight: 48.3 kg (106 lb 7.7 oz)    Length of need (1-99 months): 3      Diet general     Sponge bath only until clinic visit     Keep surgical extremity elevated     Ice to affected area     Lifting restrictions     Weight bearing restrictions (specify)     Call MD for:  temperature >100.4     Call MD for:  persistent nausea and vomiting     Call MD for:  severe uncontrolled pain     Call MD for:  difficulty breathing, headache or visual disturbances     Call MD for:  redness, tenderness, or signs of infection (pain, swelling, redness, odor or green/yellow discharge around incision site)     Call MD for:  hives     Call MD for:  persistent dizziness or light-headedness     Leave dressing on - Keep it clean, dry, and intact until clinic visit           Discharge Procedure Orders (must include Diet, Follow-up, Activity):    Discharge Procedure Orders (must include Diet, Follow-up, Activity)  CRUTCHES FOR HOME USE   Order Specific Question Answer Comments   Type: Axillary    Height: 5' 2" (1.575 m)    Weight: 48.3 kg (106 lb 7.7 oz)    Length of need (1-99 months): 3      Diet general     Sponge bath only until clinic visit     Keep surgical extremity elevated     Ice to affected area     Lifting restrictions     Weight bearing restrictions (specify)     Call MD for:  temperature >100.4     Call MD for:  persistent nausea and vomiting     Call MD for:  severe uncontrolled pain     Call MD for:  difficulty breathing, headache or visual disturbances     Call MD for:  redness, tenderness, or signs of infection (pain, swelling, redness, odor or green/yellow discharge around incision site)     Call MD for:  hives     Call MD for:  persistent dizziness or light-headedness     Leave dressing on - Keep it clean, dry, and intact until clinic visit        "

## 2018-04-02 NOTE — H&P (VIEW-ONLY)
[facility]  PODIATRIC MEDICINE AND SURGERY  PROGRESS NOTE    Reason for Visit   Chief Complaint   Patient presents with    Foot Injury     right hallux displaced toe fracture ambulating in tall cam boot DOI 3/27/18       HPI  Racquel De La Paz is a 47 y.o. male w/ PMH of HTN, who presents today after sustaining injury to right great toe. Pt describes mechanism as fall resulting in injuring his right great toe. They deny any LOC or proximal leg pain. Pt states unable to bear any weight on right great toe. Severity of pain noted to be 8/10.  Pt was seen in ER in which closed reduction was attempted, but unsuccessful. Pt is ambulating in boot with discomfort.   Patient denies other pedal complaints at this time.    DOI:3/27/18       PMH  Past Medical History:   Diagnosis Date    Hypertension     Kidney stones        MEDS  Current Outpatient Prescriptions on File Prior to Visit   Medication Sig Dispense Refill    hydrocodone-acetaminophen 5-325mg (NORCO) 5-325 mg per tablet Take 1 tablet by mouth every 4 (four) hours as needed for Pain. 12 tablet 0    ketorolac (TORADOL) 10 mg tablet Take 1 tablet (10 mg total) by mouth every 6 (six) hours. 14 tablet 0    ondansetron (ZOFRAN-ODT) 4 MG TbDL Take 1 tablet (4 mg total) by mouth every 6 (six) hours as needed. 15 tablet 0    tamsulosin (FLOMAX) 0.4 mg Cp24 Take 1 capsule (0.4 mg total) by mouth once daily. 30 capsule 0     No current facility-administered medications on file prior to visit.        PSH   No past surgical history on file.     ALL  Review of patient's allergies indicates:  No Known Allergies    SOC     Social History   Substance Use Topics    Smoking status: Current Every Day Smoker     Packs/day: 1.00     Types: Cigarettes    Smokeless tobacco: Never Used    Alcohol use No         Family HX  No family history on file.         REVIEW OF SYSTEMS  General: Denies any fever or chills  Chest: Denies shortness of breath, wheezing, coughing, or  "sputum production  Heart: Denies chest pain, cold extremities, orthopenia, or reduced exercise tolerance  Musk: Positive for pain as noted to affected extremity in HPI   As noted above and per history of current illness above, otherwise negative in the remainder of the 14 systems.      PHYSICAL EXAM  Vitals:    03/28/18 0943   BP: (!) 151/91   Pulse: 75   Weight: 49.9 kg (110 lb 0.2 oz)   Height: 5' 2" (1.575 m)   PainSc:   8   PainLoc: Toe       General: This patient is well-developed, well-nourished and appears stated age, well-oriented to person, place and time, and cooperative and pleasant on today's visit    Lower Extremity Physical Exam    Vascular exam:   · Dorsalis pedis and posterior tibial pulses palpable 2/4 bilaterally.   · Capillary refill time immediate to the toes.   · Feet are warm to the touch. Skin temperature warm to warm from proximally to distally   · There are no varicosities, telangiectasias noted to bilateral foot and ankle regions.   · There is  Edema and erythema localized to right hallux     Dermatologic exam:   · Skin moist with healthy texture and turgor.  · There are no open ulcerations, lacerations, or fissures to bilateral foot and ankle regions.  · There is  evidence of ecchymosis.  There are no open wounds noted.    Neuro:   · Epicritic sensation is intact as the patient is able to sense light touch to bilateral foot and ankle regions.   · Achilles and patellar deep tendon reflexes intact  · Babinski reflex absent    MSK:   + Wiggle toes   + pain dorsal aspect of right hallux with dorsal dislocation palpable of right hallux  (-) pain at 5th metatarsal base  (-) pain at fibular malleolus  (-) pain at medial malleolus  (-) pain upon palpation of tib-fib prox syndesmosis  (-) pain at ATFL, CFL, or PTFL  (-) pain at deltoid ligaments    IMAGING   Reviewed by me and I agree with radiologist findings, 3 views of foot/ankle, reveal:  No results found for this or any previous visit.    "     No results found for this or any previous visit.    No results found for this or any previous visit.     Results for orders placed during the hospital encounter of 03/28/18   X-Ray Foot Complete 3 view Right    Narrative EXAMINATION:  XR FOOT COMPLETE 3 VIEW RIGHT    CLINICAL HISTORY:  . Displaced fracture of proximal phalanx of right great toe, initial encounter for closed fracture    TECHNIQUE:  AP, lateral, and oblique views of the right foot were performed.    COMPARISON:  03/27/2018    FINDINGS:  The recently described fracture involving the 1st proximal phalanx is again noted.  Fragment positioning appears grossly unchanged allowing for projectional differences.  Soft tissue swelling noted along the dorsum of the foot.  No associated dislocation.  Remaining visualized osseous structures appear intact.  Joint spaces are preserved.  No erosive changes demonstrated.      Impression As above.      Electronically signed by: Fco Logan MD  Date:    03/28/2018  Time:    09:02         ASSESSMENT  1. Displaced fracture of distal phalanx of right great toe, initial encounter for closed fracture  Place in Outpatient    Vital signs    Strict bedrest    Insert peripheral IV    Height and weight    POCT glucose    Verify surgical site    Verify informed consent    Void    Notify physician     Pulse Oximetry Q4H    Consult to Anesthesiology    Case Request Operating Room: OPEN REDUCTION INTERNAL FIXATION-HALLUX    Insert peripheral IV   2. Pre-op testing  CBC auto differential    Basic metabolic panel    EKG 12-lead   3. Pre-op exam  Ambulatory Referral to Internal Medicine   4. Post-operative state  X-Ray Foot Complete Right       PLAN  1. Patient was educated about clinical and imaging findings, and verbalizes understanding of above.     Diagnoses and all orders for this visit:  Displaced fracture of distal phalanx of right great toe, initial encounter for closed fracture  -     Place in Outpatient; Standing  -      Vital signs; Standing  -     Strict bedrest; Standing  -     Insert peripheral IV; Standing  -     Height and weight; Standing  -     POCT glucose; Standing  -     Verify surgical site; Standing  -     Verify informed consent; Standing  -     Void; Standing  -     Notify physician ; Standing  -     Pulse Oximetry Q4H; Standing  -     Consult to Anesthesiology; Standing  -     Case Request Operating Room: OPEN REDUCTION INTERNAL FIXATION-HALLUX    Pre-op testing  -     CBC auto differential; Future; Expected date: 03/28/2018  -     Basic metabolic panel; Future; Expected date: 03/28/2018  -     EKG 12-lead; Future    Pre-op exam  -     Ambulatory Referral to Internal Medicine    Post-operative state  -     X-Ray Foot Complete Right; Future; Expected date: 03/28/2018    Other orders  -     hydrocodone-acetaminophen 10-325mg (NORCO)  mg Tab; Take 1 tablet by mouth every 6 (six) hours as needed for Pain. Do not start until AFTER SURGERY  Dispense: 30 tablet; Refill: 0  -     cephALEXin (KEFLEX) 500 MG capsule; Take 1 capsule (500 mg total) by mouth every 12 (twelve) hours. AFTER SURGERY  Dispense: 6 capsule; Refill: 0  -     lidocaine (PF) 10 mg/ml (1%) injection 10 mg; Inject 1 mL (10 mg total) into the skin once.  -     ceFAZolin (ANCEF) 2 g in dextrose 5 % 50 mL IVPB; Inject 2 g into the vein On call Procedure (Surgery).      2. Treatment plan: Discussed operative intervention due to dislocation and displacement of toe fracture. Patient agree to proceed with operative intervention scheduled for April 2, 2018. A well padded dressing applied to right foot. Pt informed to rest, ice above noted area to assist with edema control. Discussed smoking cessation and impediment of healing. Will schedule preop labs and clearance by MD for operative risk stratification.  Informed Consent has been given to the patient. The patient understands the surgery, procedure, risks, alternatives and complications, including but not  limited to reaction to medication/anesthetics, bleeding, infection, continuous pain, incomplete pain relief, worse pain, complex regional pain syndrome/RSD, failure to relieve pain, incomplete bone and soft tissue healing, numbness, nerve damage, prolonged or incomplete correction, recurrence, foot/ankle deterioration, new deformity, permanent edema, nerve or vascular damage, blood clots, pulmonary embolism, scarring, instability, limb length inequality, problems with motion and strength, failure of implants/fixation (if applicable), the possible need for more extensive surgery, the possible need for future surgery, and the distant possibility of myocardial infarction, stroke, loss of limb/life. We also discussed the expected benefits and alternatives of the procedure, including the consequences of not proceeding with the surgery, as well as the expected postop course. No guarantees were given nor implied. The procedure has been fully reviewed with the patient and written informed consent has been obtained. We have discussed the perioperative expectations. Having answered these questions and understanding the risks and benefits of the surgery, patient has opted to proceed with the surgery.   The patient understands that vendors for surgical products may be present in the operating room.    The total visit time face to face with the patient was over 30 minutes. Time spent in counseling, discussion and coordination of care with the patient was over 15 minutes. Topics discussed as noted above.      Future Appointments  Date Time Provider Department Center   4/12/2018 2:00 PM [facility] POD [facility]   4/19/2018 2:00 PM [facility]   4/30/2018 2:15 PM [facility] XR2 [facility] XRAY [facility]   4/30/2018 2:40 PM Sarah Hernandez DPM [facility] POD [facility]       Report Electronically Signed By:  Sarah Cabrera DPM   Podiatric Medicine & Surgery  [facility]  3/29/2018

## 2018-04-02 NOTE — PLAN OF CARE
Gave home care instructions to patient and cousin, verbalized understanding.  All questions answered to the satisfaction of both.  To POV via WC, into POV with assist, no distress noted.

## 2018-04-02 NOTE — OP NOTE
[facility]  OPERATIVE NOTE  Date: 4/2/2018   Patient: Racquel De LaP az   Medical Record Number: [id number]   Surgeon: Sarah Hernandez DPM  Assistant: None  Preoperative Diagnosis: Displaced Hallux Fracture, proximal phalanx, right  foot  Postoperative Diagnosis: Displaced Hallux Fracture, proximal phalanx, right foot   Surgical Procedure(s): Open reduction with internal fixation , right hallux   Pathology: none sent  Anesthesia:    MAC with local injection of 20ccs 1:1 mixture of 0.5% marcaine plain and 1% lidocaine plain    Hemostasis: pneumatic ankle tourniquet @ 250mm Hg for 90 minutes  Estimated blood loss: 10mL  Materials:    1.2-0 vicryl, 3-0 vicryl, 4-0 nylon  suture   Implants: 2.0 mm x 18 mm, 1.5 mm x 16 mm, synthes cortical screw   Injectables: Post operative block consisting of 10ccs of 0.5% marcaine plain    Findings: consistent with diagnosis  Fluids: per anesthesia  Complications: None  Condition: Stable    Indications for Surgery:   Racquel De La Paz is a 47 y.o. male who presents with right foot injury resulting in fracture of the right hallux. Patient has agreed to proceed with surgical intervention due to fracture of the involved foot. The patient's preoperative radiographs and objective clinical findings were reviewed with the patient. Potential risks and complications were discussed with the patient in detail. The patient voiced understanding of such. The patient elected to undergo surgical treatment and accepts risks and possible complications. Preoperative history and physical were reviewed. There were no obvious contraindications noted to the surgical procedure. All the patient's questions were answered. Absolutely no guarantees were given or implied.     Procedure in Detail:   In the preoperative holding area, the patient's identity and correct operative site were verified. The operative extremity was marked with a skin marking pen following standard preoperative protocol. Thereafter, under  mild sedation, the patient was brought to the operating room and was placed on the operating table in the supine position. A formal timeout was taken to identify the patient and the correct operative site prior to commencing the case.  The anesthesia team administered IV sedation and prophylactic antibiotics.  A preoperative block was then performed after adequate sedation was obtained consisting of 10mL of a 1:1 mixture of 1% lidocaine plain and 0.5% marcaine plain. A well padded pneumatic ankle tourniquet  was then applied to the right ankle.  The right foot was scrubbed, prepped, and draped in the normal aseptic technique. A second final timeout was taken prior to commencing the case. The right lower extremity was elevated and exsanguinated utilizing an esmarch bandage. The pneumatic ankle tourniquet was inflated to 250mm Hg and the right lower extremity was lowered to the table.       ORIF of Right Hallux  Attention was directed to the dorsal aspect of the riight foot. A longitudinal incision was made on medial aspect of right hallux extending just proximal to first metatarsophalangeal joint. The incision was deepened with care to retract all vital neurovascular and tendinous structures. The incision was deepened directly to bone and the displaced fracture was noted on proximal phalanx. The distal fragment was noted to be dorsally and medially displaced. There was a short oblique fracture noted along phalanx with dorsal dislocation. The fracture fragment were distracted and any hemorrhage was cleansed with dental pick. The fragments were then irrigated with copious amounts of saline. A reduction clamp was utilized after correction of the deformity, and 0.045 inch k-wire was driven through for temporary fixation. The position was noted satisfactory as confirmed with intraoperative fluoroscopy. An interfragmentary screw was then placed across the fracture site measuring 2.0 mmx18 mm were using standard AO  technique. An additional screw was placed for additional stability measuring 1.5 mm x 16 mm with standard AO technique.The fracture fragments were noted to be stable.  Intraoperative fluroscopy was utilized to check position, and the temporary pin was removed.    Copious irrigation was then performed. The capsular and subcutaneous layer was reapproximated with 2-0  And 3-0 vicryl suture and the skin was reapproximated with 4-0 nylon suture in a simple interrupted fashion. The incision site was dressed with betadine ointment, adaptic, kerlix, and a lira compression splint was then applied to the affected extremity. The tourniquet was deflated at this time and  hyperemia was noted to all the digits of the operative foot. The patient tolerated the procedure and anesthesia well. The patient  was transferred to the post anesthesia care unit (PACU) with vital signs stable and vascular status intact to the operative foot.    Report Electronically Signed By:  Sarah Hernandez DPM   Podiatric Medicine & Surgery  [facility]  4/2/2018

## 2018-04-02 NOTE — INTERVAL H&P NOTE
The patient has been examined and the H&P has been reviewed:    I concur with the findings and no changes have occurred since H&P was written.    Anesthesia/Surgery risks, benefits and alternative options discussed and understood by patient/family.          Active Hospital Problems    Diagnosis  POA    Displaced fracture of distal phalanx of right great toe, initial encounter for closed fracture [S92.421A]  Yes      Resolved Hospital Problems    Diagnosis Date Resolved POA   No resolved problems to display.

## 2018-04-02 NOTE — DISCHARGE INSTRUCTIONS
Racquel De La Paz    PODIATRIC SURGERY DISCHARGE INSTRUCTIONS    GENERAL ANESTHESIA OR SEDATION  1.  Do NOT drive or operate machinery for at least 16 hours.  2.  Do NOT drink alcohol; take tranquilizers, or sleeping pills (unless okayed by your surgeon) while taking narcotic pain medications.  3.  Do NOT make important decisions or sign any important papers while taking narcotic pain medications.  4.   You need to have a responsible adult to be home with you tonight or readily available (nearby) if needed.      ACTIVITY  1.  You are advised to go directly home form the hospital.  Restrict activities and rest.    2.  No heavy lifting, no pushing, no straining until cleared by Dr. Cabrera.   3.  In the next 2 weeks keep your surgical foot / ankle above the heart as much as possible (Toes above the Nose).  Elevate it on 3-4 pillows.    4.  For bathing you may sponge bathe, or cover your cast/bandage with a trash bag and tape above the knee with duct tape.  There are also commercially available cast protectors to purchase at the pharmacy ([employer] or [employer], ~$15.00)  5.  Do not get your cast / bandage WET.  If it gets SOAKED, IT WILL NEED TO BE CHANGED.  Do NOT sit with a wet bandage.  If it is during office hours, come to our clinic. If after hours, page the podiatry physician on call at [phone].  6.You must remain NON WEIGHT BEARING TO your operative foot with use of crutches or wheelchair for assistance. Do not put any pressure on the BALL OF YOUR operative foot until you are instructed by your surgeon, Dr. Cabrera. You may use crutches or also have the option of purchasing a knee scooter (available on [employer], [employer], [employer]) to assist you with getting around. If you have any problems using crutches, please contact your surgeon.    FLUID, DIET, AND URINATION  1.  Begin with clear liquids; clear soup, Jell-O, dry toast and soda crackers.  If not nauseated, you may go to regular diet when you desire.  2.   "Greasy and spicy foods are not advised.  3.  If unable to urinate after 8 hours after surgery, call the clinic.    MEDICATIONS  1.  Prescriptions have been sent with you or should already be filled.  Use as directed on instructions.  When using pain medications, you may experience dizziness or drowsiness.  Do NOT drink alcohol or drive when you are taking these medications.  Always eat before taking pain medications.    2.  Use a stool softener to minimize constipation while taking pain medications.      OPERATIVE SITE  1.  Keep the dressing / cast clean and dry until follow up with your surgeon. (If it becomes wet, you must inform your doctor immediately)  2.  Do NOT remove the dressing / cast.  3.  If some bleeding through the bandages is noted, add some more bandages.  If the bleeding continues then notify the clinic.  4.  Ice for 20 minutes every 2 hours.  Apply during waking hours for the next 48 hours.  If you have a cast/splint, ice behind the knee.  If you have a bandage, place the ice bag over the ankle or top of the foot.    5. You should expect to have some numbness in your foot following surgery for several hours.     SIGNS / SYMPTOMS "Danger Signals" TO REPORT TO THE PHYSICIAN OR ADVICE CENTER  1.  Temperature > 101.5 degrees F or higher  2.  Excessive pain not controlled with pain medications.  3.  Excessive nausea and/or vomiting. (Nausea is common after having anesthesia for the first 24-36 hours).  4.  Increased bleeding from the incision site.  (It is common for blood to strike through the bandage in the first 24  hours following surgery especially if your foot down for a period of time. Elevate your foot, you may reinforce dressing. If any symptoms of blood loss (Lightheaded, dizziness, bandages are completely soaked, then this is considered abnormal. Call the clinic or report to ER for evaluation).  5. Toes become cold or purple  6. Calf pain, shortness of breath, or chest pain    PHONE ADVICE " NUMBERS  -If you have any questions, here is how you can reach Dr. Cabrera or the podiatrist on call.    You can reach me at [facility].  Ask to speak to Dr. Cabrera's Nurse in the podiatry department ([place] or [place])    Our phone numbers at [facility]  (8AM- 5PM) are: [phone] for the [facility] (at [facility]) or [phone] for the Counts include 234 beds at the [facility] on [facility].   [email or web address] is another way to contact your doctor if you are an active participant online with [facility].    If After Clinic Hours:  For general questions, 365 days of the year, 24/7 you can reach the [facility] On-Call Registered Nurse at:  [phone]    [facility] (You can call the hospital directly and ask to speak to the Podiatrist On Call)   [phone]      If you feel there is an Emergency, then Go to the nearest Emergency Room or call [phone].        FOLLOW UP APPOINTMENT  You are scheduled to follow up as listed below for your post operative visit. Please pay attention to the LOCATION ([facility]- [facility] or [facility]). Arrive to your appointment at least 20 minutes before time of appointment.    Future Appointments  Date Time Provider Department Center   4/12/2018 2:00 PM Sarah Hernandez DPM [facility]OD [facility]   4/19/2018 2:00 PM [facility]   4/30/2018 2:15 PM [facility]2 [facility]   4/30/2018 2:40 PM Sarah Hernandez DPM [facility] POD [place]       For your information  SWELLING AND YOUR FOOT    Swelling is your enemy and will cause pain if experienced in any excess.  Having your foot down or walking will cause increased swelling and pain.  If swelling is controlled pain will generally also be controlled.  You should expect swelling to some extent for 9-12 months following your surgery depending on the type of surgery.  Increased activity will cause an increase in swelling and subsequently an increase in pain.    It is common for surgeries  that affect the toes to limit the ability to return to normal shoes for 3 months following surgery due to swelling.  That time will vary depending on the depth and severity of your condition. You may need to purchase a new pair of shoes that is 1/2 to 1 size larger than you are used to wearing to accommodate for swelling. If you are on your feet excessively in the first 2 weeks following surgery your healing and return to activity times will significantly increase.

## 2018-04-04 NOTE — ANESTHESIA POSTPROCEDURE EVALUATION
"Anesthesia Post Evaluation    Patient: Racquel De La Paz    Procedure(s) Performed: Procedure(s) (LRB):  OPEN REDUCTION INTERNAL FIXATION-TOE-METATARSAL (Right)    Final Anesthesia Type: general  Patient location during evaluation: PACU  Patient participation: Yes- Able to Participate  Level of consciousness: awake and alert  Post-procedure vital signs: reviewed and stable  Pain management: adequate  Airway patency: patent  PONV status at discharge: No PONV  Anesthetic complications: no      Cardiovascular status: blood pressure returned to baseline  Respiratory status: unassisted and spontaneous ventilation  Hydration status: euvolemic  Follow-up not needed.        Visit Vitals  BP (!) 135/90   Pulse 62   Temp 36.8 °C (98.2 °F) (Temporal)   Resp 18   Ht 5' 2" (1.575 m)   Wt 48.3 kg (106 lb 7.7 oz)   SpO2 99%   BMI 19.48 kg/m²       Pain/Carol Score: No Data Recorded      "

## 2018-04-10 ENCOUNTER — OFFICE VISIT (OUTPATIENT)
Dept: PODIATRY | Facility: CLINIC | Age: 48
End: 2018-04-10
Payer: MEDICAID

## 2018-04-10 VITALS
HEIGHT: 62 IN | BODY MASS INDEX: 19.6 KG/M2 | WEIGHT: 106.5 LBS | DIASTOLIC BLOOD PRESSURE: 95 MMHG | HEART RATE: 65 BPM | TEMPERATURE: 98 F | SYSTOLIC BLOOD PRESSURE: 146 MMHG

## 2018-04-10 VITALS
RESPIRATION RATE: 18 BRPM | OXYGEN SATURATION: 99 % | HEIGHT: 62 IN | WEIGHT: 106.5 LBS | HEART RATE: 62 BPM | SYSTOLIC BLOOD PRESSURE: 135 MMHG | BODY MASS INDEX: 19.6 KG/M2 | TEMPERATURE: 98 F | DIASTOLIC BLOOD PRESSURE: 90 MMHG

## 2018-04-10 DIAGNOSIS — Z98.890 POST-OPERATIVE STATE: Primary | ICD-10-CM

## 2018-04-10 PROCEDURE — 99024 POSTOP FOLLOW-UP VISIT: CPT | Mod: ,,, | Performed by: PODIATRIST

## 2018-04-10 PROCEDURE — 99213 OFFICE O/P EST LOW 20 MIN: CPT | Mod: PBBFAC,PO | Performed by: PODIATRIST

## 2018-04-10 PROCEDURE — 99999 PR PBB SHADOW E&M-EST. PATIENT-LVL III: CPT | Mod: PBBFAC,,, | Performed by: PODIATRIST

## 2018-04-10 RX ORDER — MINOCYCLINE HYDROCHLORIDE 50 MG/1
CAPSULE ORAL
COMMUNITY
Start: 2018-03-18

## 2018-04-10 RX ORDER — MELOXICAM 7.5 MG/1
TABLET ORAL
COMMUNITY
Start: 2018-03-31

## 2018-04-10 RX ORDER — CLINDAMYCIN PHOSPHATE 10 MG/G
GEL TOPICAL
COMMUNITY
Start: 2018-03-05

## 2018-04-10 NOTE — PROGRESS NOTES
"PODIATRY PROGRESS NOTE  Chief Complaint   Patient presents with    Post-op Evaluation     DOS 4/2/18 Right ORIF. Sutures intact. Minimal pain         SUBJECTIVE   Si IRMA De La Paz is a 47 y.o. male status post right hallux ORIF , DOS 4/2/18. Pt is appx 1 week(s) post operatively. Pain is well controlled with pain medications. Pt has been WBAT on heel.  States  has kept dressing clean/dry; Pt denies fever, chills, nausea, and/or vomiting. Pt has no new complaint(s).     OBJECTIVE   Vitals:    04/10/18 1055   BP: (!) 146/95   Pulse: 65   Temp: 97.6 °F (36.4 °C)   TempSrc: Oral   Weight: 48.3 kg (106 lb 7.7 oz)   Height: 5' 2" (1.575 m)   PainSc:   3   PainLoc: Foot       Neurovascular status - vascular status grossly intact.   Surgical incision well coapted with sutures  Pins - N/A   Erythema - none  Edema - mild localized to surgery site   Warmth - no increase in skin temp from prox to distally b/l   TTP - mild at the incision site consistent with po course  ROM - good, pain free   Dehiscence- none  Drainage- none    Radiographs reviewed:   -  post op.    ASSESSMENT  1. Surgery follow-up examination    PLAN  - Weight bearing status - WBAT   - Ambulatory aids -crutches   - Dressing - Dressing change was performed. Pt instructed to keep clean, dry, intact. Do not get wet.   - Follow up - RTC as scheduled     Future Appointments  Date Time Provider Department Center   4/17/2018 10:40 AM Sarah Hernandez DPM [facility] POD [place]   4/30/2018 2:15 PM [facility] XR2 [facility] XRAY [facility]   4/30/2018 2:40 PM Sarah Hernandez DPM [facility] POD [place]       Report Electronically Signed By:  Sarah Hernandez DPM   Podiatric Medicine & Surgery  [facility]  4/10/2018  10:52 AM            "

## 2018-04-17 ENCOUNTER — OFFICE VISIT (OUTPATIENT)
Dept: PODIATRY | Facility: CLINIC | Age: 48
End: 2018-04-17
Payer: MEDICAID

## 2018-04-17 VITALS
HEIGHT: 62 IN | DIASTOLIC BLOOD PRESSURE: 85 MMHG | TEMPERATURE: 97 F | BODY MASS INDEX: 19.6 KG/M2 | SYSTOLIC BLOOD PRESSURE: 128 MMHG | WEIGHT: 106.5 LBS | HEART RATE: 68 BPM

## 2018-04-17 DIAGNOSIS — Z98.890 POST-OPERATIVE STATE: Primary | ICD-10-CM

## 2018-04-17 PROCEDURE — 99024 POSTOP FOLLOW-UP VISIT: CPT | Mod: ,,, | Performed by: PODIATRIST

## 2018-04-17 PROCEDURE — 99213 OFFICE O/P EST LOW 20 MIN: CPT | Mod: PBBFAC,PO | Performed by: PODIATRIST

## 2018-04-17 PROCEDURE — 99999 PR PBB SHADOW E&M-EST. PATIENT-LVL III: CPT | Mod: PBBFAC,,, | Performed by: PODIATRIST

## 2018-04-17 NOTE — PROGRESS NOTES
"PODIATRY PROGRESS NOTE  Chief Complaint   Patient presents with    Post-op Evaluation     DOS 4/2/18 right ORIF         SUBJECTIVE   Racquel De La Paz is a 47 y.o. male status post right hallux ORIF , DOS 4/2/18. Pt is appx  2 week(s) post operatively. Pain is well controlled with pain medications. Pt has been WBAT on heel.  States  has kept dressing clean/dry; Pt denies fever, chills, nausea, and/or vomiting. Pt has no new complaint(s).     OBJECTIVE   Vitals:    04/17/18 1055   BP: 128/85   Pulse: 68   Temp: 97.2 °F (36.2 °C)   TempSrc: Oral   Weight: 48.3 kg (106 lb 7.7 oz)   Height: 5' 2" (1.575 m)   PainSc: 0-No pain       Neurovascular status - vascular status grossly intact.   Surgical incision well coapted with sutures  Pins - N/A   Erythema - none  Edema - mild localized to surgery site   Warmth - no increase in skin temp from prox to distally b/l   TTP - mild at the incision site consistent with po course  ROM - good, pain free   Dehiscence- none  Drainage- none    Radiographs reviewed:   -  post op.    ASSESSMENT  1. Surgery follow-up examination    PLAN  - Weight bearing status - WBAT   - Ambulatory aids -crutches   - Dressing - Dressing change was performed. Sutures removed and splint for toe reapplied with extended foot plate. Pt instructed to keep clean, dry, intact. Do not get wet.   - Follow up - RTC as scheduled 4 wk post op with xrays    Future Appointments  Date Time Provider Department Center   4/30/2018 2:15 PM [facility] XR2 [facility]RAY [facility]   4/30/2018 2:40 PM Sarah Hernandez DPM [facility] POD [place]       Report Electronically Signed By:  Sarah Hernandez DPM   Podiatric Medicine & Surgery  [facility]  4/17/2018  10:52 AM            "

## 2018-04-27 ENCOUNTER — OFFICE VISIT (OUTPATIENT)
Dept: PODIATRY | Facility: CLINIC | Age: 48
End: 2018-04-27
Payer: MEDICAID

## 2018-04-27 ENCOUNTER — HOSPITAL ENCOUNTER (OUTPATIENT)
Dept: RADIOLOGY | Facility: HOSPITAL | Age: 48
Discharge: HOME | End: 2018-04-27
Attending: PODIATRIST
Payer: MEDICAID

## 2018-04-27 VITALS
WEIGHT: 106.5 LBS | SYSTOLIC BLOOD PRESSURE: 131 MMHG | BODY MASS INDEX: 19.6 KG/M2 | DIASTOLIC BLOOD PRESSURE: 85 MMHG | HEIGHT: 62 IN | HEART RATE: 60 BPM

## 2018-04-27 DIAGNOSIS — Z98.890 POST-OPERATIVE STATE: Primary | ICD-10-CM

## 2018-04-27 DIAGNOSIS — Z98.890 POST-OPERATIVE STATE: ICD-10-CM

## 2018-04-27 PROCEDURE — 99213 OFFICE O/P EST LOW 20 MIN: CPT | Mod: PBBFAC,25 | Performed by: PODIATRIST

## 2018-04-27 PROCEDURE — 99999 PR PBB SHADOW E&M-EST. PATIENT-LVL III: CPT | Mod: PBBFAC,,, | Performed by: PODIATRIST

## 2018-04-27 PROCEDURE — 73630 X-RAY EXAM OF FOOT: CPT | Mod: TC,RT

## 2018-04-27 PROCEDURE — 73630 X-RAY EXAM OF FOOT: CPT | Mod: 26,RT,, | Performed by: RADIOLOGY

## 2018-04-27 PROCEDURE — 99024 POSTOP FOLLOW-UP VISIT: CPT | Mod: ,,, | Performed by: PODIATRIST

## 2018-04-27 NOTE — PROGRESS NOTES
"PODIATRY PROGRESS NOTE  Chief Complaint   Patient presents with    Post-op Evaluation     4 wk post op right ORIF DOS 4/2/18         SUBJECTIVE   Si IRMA De La Paz is a 47 y.o. male status post right hallux ORIF , DOS 4/2/18. Pt is appx  4 week(s) post operatively.Pt has been WBAT on heel but presents to clinic ambulating fully on splint. States he tries to stay off the toe but must ambulate around house. He does not use crutches (upper body strength) or knee scooter/wheelchair. States he feel more comfortable ambulating on "side of foot".  States  has kept dressing clean/dry; Pt denies fever, chills, nausea, and/or vomiting. Pt has no new complaint(s).     OBJECTIVE   Vitals:    04/27/18 1006   BP: 131/85   Pulse: 60   Weight: 48.3 kg (106 lb 7.7 oz)   Height: 5' 2" (1.575 m)   PainSc:   2   PainLoc: Foot       Neurovascular status - vascular status grossly intact.   Surgical incision well coapted with steri strips intact  Pins - N/A   Erythema - none  Edema - mild localized to surgery site   Warmth - no increase in skin temp from prox to distally b/l   TTP -none   ROM - good, pain free   Dehiscence- none  Drainage- none    Radiographs reviewed:   -  post op. Slight medial displacement of distal fragment    ASSESSMENT  1. Surgery follow-up examination    PLAN  - Weight bearing status - WBAT on heel only  Recommend assistive device- walker or wheelchair/kirti scooter. Pt declines  -  Reinforced compliance as there is mild displacement of fracture. If weight is placed on digit can result in further displacement and/or fracture non union.   - Dressing - Dressing change was performed. Splint for toe reapplied with extended foot plate. Pt instructed to keep clean, dry, intact. Do not get wet.   - Follow up - RTC as scheduled 4 wk post op with xrays, will fully weight bearing pending xray    Future Appointments  Date Time Provider Department Center   5/24/2018 8:30 AM [facility] XR2 [facility] XRAY [place]   5/24/2018 9:00 AM Sarah KIM" ONEIDA Hernandez [facility] POD [place]       Report Electronically Signed By:  Sarah Hernandez DPM   Podiatric Medicine & Surgery  [facility]  4/27/2018  10:52 AM

## 2018-05-25 ENCOUNTER — TELEPHONE (OUTPATIENT)
Dept: PODIATRY | Facility: CLINIC | Age: 48
End: 2018-05-25

## 2018-05-25 NOTE — TELEPHONE ENCOUNTER
Attempted to contact pt in reference to appt today with podiatry. There was no answer. Voicemail was left to return call to clinic.

## (undated) DEVICE — SEE MEDLINE ITEM 146347

## (undated) DEVICE — WIRE C TROCAR TIP .045
Type: IMPLANTABLE DEVICE | Site: FOOT | Status: NON-FUNCTIONAL
Removed: 2018-04-02

## (undated) DEVICE — SUT VICRYL CTD 2-0 GI 27 SH

## (undated) DEVICE — SUT 4-0 ETHILON 18 PS-2

## (undated) DEVICE — SEE MEDLINE ITEM 146231

## (undated) DEVICE — GLOVE 7.0 PROTEXIS PI BLUE

## (undated) DEVICE — DRESSING SPONGE 16PLY 4X4 NS

## (undated) DEVICE — SEE MEDLINE ITEM 146308

## (undated) DEVICE — DRAPE C-ARMOR EQUIPMENT COVER

## (undated) DEVICE — PAD CAST SPECIALIST STRL 6

## (undated) DEVICE — APPLICATOR CHLORAPREP ORN 26ML

## (undated) DEVICE — SEE MEDLINE ITEM 157027

## (undated) DEVICE — BIT DRILL 2.0MM D 65MM/50MM.

## (undated) DEVICE — SYR 10CC LUER LOCK

## (undated) DEVICE — SEE MEDLINE ITEM 146298

## (undated) DEVICE — GLOVE BIOGEL SENSOR SZ 6.5

## (undated) DEVICE — SPONGE DERMACEA GAUZE 4X4

## (undated) DEVICE — Device

## (undated) DEVICE — SEE MEDLINE ITEM 152528

## (undated) DEVICE — SUT VICRYL PLUS 3-0 PS2 18

## (undated) DEVICE — PAD CAST SPECIALIST STRL 4

## (undated) DEVICE — TOURNIQUET SB QC SP 18X4IN

## (undated) DEVICE — BIT DRILL MINI QC 1.5X96MM

## (undated) DEVICE — SUT ETHILON 3-0 PS2 18 BLK

## (undated) DEVICE — BURR CARBIDE OVAL STERILE 4MM

## (undated) DEVICE — PAD ABD 8X10 STERILE

## (undated) DEVICE — PAD ADULT ELECTRODE GROUNDING

## (undated) DEVICE — COVER OVERHEAD SURG LT BLUE

## (undated) DEVICE — BLADE AVG MEDIUM 25 X 9

## (undated) DEVICE — SEE MEDLINE ITEM 157131

## (undated) DEVICE — DRESSING N ADH OIL EMUL 3X3

## (undated) DEVICE — BLADE SURG #15 CARBON STEEL

## (undated) DEVICE — MANIFOLD 4 PORT

## (undated) DEVICE — NDL SAFETY 25G X 1.5 ECLIPSE

## (undated) DEVICE — PAD UNDERPAD 30X30

## (undated) DEVICE — SEE MEDLINE ITEM 152522

## (undated) DEVICE — SUT VICRYL PLUS 4-0 P3 18IN